# Patient Record
Sex: MALE | Race: WHITE | NOT HISPANIC OR LATINO | Employment: UNEMPLOYED | URBAN - METROPOLITAN AREA
[De-identification: names, ages, dates, MRNs, and addresses within clinical notes are randomized per-mention and may not be internally consistent; named-entity substitution may affect disease eponyms.]

---

## 2019-07-18 ENCOUNTER — TELEPHONE (OUTPATIENT)
Dept: ORTHOPEDIC SURGERY | Facility: CLINIC | Age: 19
End: 2019-07-18

## 2021-11-01 ENCOUNTER — TRANSCRIBE ORDERS (OUTPATIENT)
Dept: ADMINISTRATIVE | Facility: HOSPITAL | Age: 21
End: 2021-11-01

## 2021-11-01 DIAGNOSIS — M54.50 LUMBAR PAIN: Primary | ICD-10-CM

## 2021-11-02 ENCOUNTER — HOSPITAL ENCOUNTER (OUTPATIENT)
Dept: MRI IMAGING | Facility: HOSPITAL | Age: 21
Discharge: HOME | End: 2021-11-02
Admitting: SPECIALIST

## 2021-11-02 DIAGNOSIS — M54.50 LUMBAR PAIN: ICD-10-CM

## 2021-11-02 PROCEDURE — 72148 MRI LUMBAR SPINE W/O DYE: CPT

## 2023-07-17 PROBLEM — F41.8 ANXIETY WITH DEPRESSION: Status: ACTIVE | Noted: 2023-07-17

## 2023-07-26 ENCOUNTER — PATIENT ROUNDING (BHMG ONLY) (OUTPATIENT)
Dept: FAMILY MEDICINE CLINIC | Facility: CLINIC | Age: 23
End: 2023-07-26
Payer: COMMERCIAL

## 2023-07-26 NOTE — PROGRESS NOTES
July 26, 2023    Hello, may I speak with Winston Pryor?    My name is Crystal Montiel      [facility]  [address] [place] [id number] [phone].    Before we get started may I verify your date of birth? 2000    I am calling to officially welcome you to our practice and ask about your recent visit. Is this a good time to talk? No, my chart message sent

## 2023-08-23 ENCOUNTER — TELEPHONE (OUTPATIENT)
Dept: FAMILY MEDICINE CLINIC | Facility: CLINIC | Age: 23
End: 2023-08-23

## 2023-08-23 ENCOUNTER — OFFICE VISIT (OUTPATIENT)
Dept: FAMILY MEDICINE CLINIC | Facility: CLINIC | Age: 23
End: 2023-08-23
Payer: COMMERCIAL

## 2023-08-23 VITALS
RESPIRATION RATE: 16 BRPM | DIASTOLIC BLOOD PRESSURE: 78 MMHG | SYSTOLIC BLOOD PRESSURE: 100 MMHG | BODY MASS INDEX: 26.37 KG/M2 | OXYGEN SATURATION: 98 % | TEMPERATURE: 98 F | HEIGHT: 73 IN | HEART RATE: 70 BPM | WEIGHT: 199 LBS

## 2023-08-23 DIAGNOSIS — H57.89 EYE SWELLING, LEFT: Primary | ICD-10-CM

## 2023-08-23 PROCEDURE — 99213 OFFICE O/P EST LOW 20 MIN: CPT | Performed by: NURSE PRACTITIONER

## 2023-08-23 RX ORDER — METHYLPREDNISOLONE 4 MG/1
TABLET ORAL
Qty: 21 EACH | Refills: 0 | Status: SHIPPED | OUTPATIENT
Start: 2023-08-23

## 2023-08-23 RX ORDER — OLOPATADINE HYDROCHLORIDE 1 MG/ML
1 SOLUTION/ DROPS OPHTHALMIC 2 TIMES DAILY
COMMUNITY
Start: 2023-08-04 | End: 2023-09-03

## 2023-08-23 RX ORDER — OFLOXACIN 3 MG/ML
1 SOLUTION/ DROPS OPHTHALMIC 4 TIMES DAILY
COMMUNITY
Start: 2023-08-04

## 2023-08-23 RX ORDER — AMOXICILLIN AND CLAVULANATE POTASSIUM 875; 125 MG/1; MG/1
1 TABLET, FILM COATED ORAL 2 TIMES DAILY
Qty: 20 TABLET | Refills: 0 | Status: SHIPPED | OUTPATIENT
Start: 2023-08-23 | End: 2023-09-02

## 2023-08-23 NOTE — PROGRESS NOTES
"Chief Complaint  Eye Problem (STARTED LAST NIGHT MAINLY LEFT EYE HAS REDNESS AND ITCHY, AND YELLOW DRAINAGE PERSISTENT EYE INFECTION IN BOTH EYES, HAS BEEN SEEN AT IMMEDIATE CARE AND WAS REFERRED BACK TO PCP FOR MORE PERMANENT SOLUTION)    Subjective        Winston Pryor presents to [facility]  History of Present Illness  Wisnton Pryor is a 23 y.o. male who presents to the clinic today with concerns of an eye problem.  Patient's symptoms started last night to his left eye.  He has had redness, itching, swelling, and yellow drainage from his eye.  Patient has a history of conjunctivitis, but to the right eye.  He denies vision changes or eye pain, but has had some pressure to the eye due to swelling.  He denies fever or chills.  He does have allergies to cats and does have a cat at home.  He is not taking a daily allergy medication.  He denies known exposure to anyone with pinkeye.  He does wear contacts, but through out a box of contacts he was using.  He is wearing glasses today.    Patient went to urgent care on 8/4/2023 and diagnosed with conjunctivitis.  Patient had an infection of the month prior with a stye was treated with Ilotycin ointment.  On 8/4/2023, patient was treated with ofloxacin and olopatadine.  Eye culture performed that showed no growth.     Review of Systems   Constitutional:  Negative for chills, fatigue and fever.   Eyes:  Positive for discharge, redness and itching. Negative for photophobia, pain and visual disturbance.     Objective   Vital Signs:  /78   Pulse 70   Temp 98 øF (36.7 øC)   Resp 16   Ht 185.4 cm (72.99\")   Wt 90.3 kg (199 lb)   SpO2 98%   BMI 26.26 kg/mý   Estimated body mass index is 26.26 kg/mý as calculated from the following:    Height as of this encounter: 185.4 cm (72.99\").    Weight as of this encounter: 90.3 kg (199 lb).             Physical Exam  Vitals reviewed.   Constitutional:       General: He is not " in acute distress.     Appearance: Normal appearance. He is not ill-appearing, toxic-appearing or diaphoretic.   HENT:      Head: Normocephalic and atraumatic.   Eyes:      General: No scleral icterus.        Right eye: No discharge.         Left eye: No foreign body, discharge or hordeolum.      Extraocular Movements:      Left eye: No nystagmus.      Conjunctiva/sclera:      Left eye: Left conjunctiva is injected. No chemosis, exudate or hemorrhage.     Comments: Erythema and edema noted to left lower eyelid   Neurological:      General: No focal deficit present.      Mental Status: He is alert and oriented to person, place, and time.      Motor: No weakness.      Gait: Gait normal.   Psychiatric:         Mood and Affect: Mood normal.         Behavior: Behavior normal.      Result Review :      Data reviewed : Eye culture,  notes             Assessment and Plan   Diagnoses and all orders for this visit:    1. Eye swelling, left (Primary)  -     amoxicillin-clavulanate (AUGMENTIN) 875-125 MG per tablet; Take 1 tablet by mouth 2 (Two) Times a Day for 10 days.  Dispense: 20 tablet; Refill: 0  -     methylPREDNISolone (MEDROL) 4 MG dose pack; Take as directed on package instructions.  Dispense: 21 each; Refill: 0      Given acute swelling and recent history of conjunctivitis, will treat patient with Augmentin and Medrol Dosepak.  Patient's symptoms could be triggered by allergies, so recommended taking Zyrtec or Claritin daily.  Patient is going to call his optometrist today to schedule an appointment today or tomorrow for an evaluation.  Patient will return for reevaluation with me on Friday.  He was advised to go to the hospital for any changes in vision, headache, or eye pain.  He was advised not to wear contacts, but continue with glasses.  We discussed not touching the eye or using the same warm compress.  Patient verbalized understanding.         Follow Up   Return in about 2 days (around 8/25/2023) for  Recheck.  Patient was given instructions and counseling regarding his condition or for health maintenance advice. Please see specific information pulled into the AVS if appropriate.       Electronically signed by RAJWINDER Vilchis, 08/23/23, 12:00 PM EDT.

## 2023-09-20 ENCOUNTER — TELEPHONE (OUTPATIENT)
Dept: FAMILY MEDICINE CLINIC | Facility: CLINIC | Age: 23
End: 2023-09-20
Payer: COMMERCIAL
